# Patient Record
Sex: FEMALE | Race: WHITE
[De-identification: names, ages, dates, MRNs, and addresses within clinical notes are randomized per-mention and may not be internally consistent; named-entity substitution may affect disease eponyms.]

---

## 2017-06-05 ENCOUNTER — HOSPITAL ENCOUNTER (EMERGENCY)
Dept: HOSPITAL 31 - C.ER | Age: 77
Discharge: HOME | End: 2017-06-05
Payer: COMMERCIAL

## 2017-06-05 VITALS
SYSTOLIC BLOOD PRESSURE: 159 MMHG | DIASTOLIC BLOOD PRESSURE: 68 MMHG | RESPIRATION RATE: 18 BRPM | TEMPERATURE: 98.3 F | HEART RATE: 73 BPM

## 2017-06-05 VITALS — OXYGEN SATURATION: 100 %

## 2017-06-05 VITALS — BODY MASS INDEX: 24.5 KG/M2
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
## 2017-06-05 DIAGNOSIS — S46.912A: Primary | ICD-10-CM

## 2017-06-05 LAB
ALBUMIN/GLOB SERPL: 1.1 {RATIO}
ALP SERPL-CCNC: 62 U/L
ALT SERPL-CCNC: 16 U/L
AST SERPL-CCNC: 30 U/L
BACTERIA #/AREA URNS HPF: (no result) /[HPF]
BASOPHILS # BLD AUTO: 0 K/UL
BASOPHILS NFR BLD: 0.6 %
BILIRUB SERPL-MCNC: 0.7 MG/DL
BILIRUB UR-MCNC: NEGATIVE MG/DL
BUN SERPL-MCNC: 19 MG/DL
CALCIUM SERPL-MCNC: 8.7 MG/DL
CHLORIDE SERPL-SCNC: 99 MMOL/L
CO2 SERPL-SCNC: 26 MMOL/L
EOSINOPHIL # BLD AUTO: 0.3 K/UL
EOSINOPHIL NFR BLD: 3.6 %
ERYTHROCYTE [DISTWIDTH] IN BLOOD BY AUTOMATED COUNT: 13.6 %
GLOBULIN SER-MCNC: 3.7 GM/DL
GLUCOSE SERPL-MCNC: 131 MG/DL
GLUCOSE UR STRIP-MCNC: NORMAL MG/DL
HCT VFR BLD CALC: 38.6 %
KETONES UR STRIP-MCNC: NEGATIVE MG/DL
LEUKOCYTE ESTERASE UR-ACNC: (no result) LEU/UL
LYMPHOCYTES # BLD AUTO: 4 K/UL
LYMPHOCYTES NFR BLD AUTO: 47.9 %
MCH RBC QN AUTO: 27.9 PG
MCHC RBC AUTO-ENTMCNC: 32.2 G/DL
MCV RBC AUTO: 86.6 FL
MONOCYTES # BLD: 0.8 K/UL
MONOCYTES NFR BLD: 9.8 %
NRBC BLD AUTO-RTO: 0 %
PH UR STRIP: 6 [PH]
PLATELET # BLD: 354 K/UL
PMV BLD AUTO: 7.5 FL
POTASSIUM SERPL-SCNC: 4.6 MMOL/L
PROT SERPL-MCNC: 7.7 G/DL
PROT UR STRIP-MCNC: NEGATIVE MG/DL
RBC # UR STRIP: (no result) /UL
RBC #/AREA URNS HPF: 10 /HPF
SODIUM SERPL-SCNC: 137 MMOL/L
SP GR UR STRIP: 1.01
UROBILINOGEN UR-MCNC: NORMAL MG/DL
WBC # BLD AUTO: 8.3 K/UL
WBC #/AREA URNS HPF: 24 /HPF

## 2017-06-05 NOTE — C.PDOC
History Of Present Illness


75 y/o female presents to the emergency department for evaluation of left-sided 

trapezius discomfort which radiates down left arm for 2 days. Patient denies 

recent trauma/falls and has not sought therapy for symptoms.


Time Seen by Provider: 17 14:16


Chief Complaint (Nursing): Upper Extremity Problem/Injury


History Per: Patient


History/Exam Limitations: no limitations


Onset/Duration Of Symptoms: Days (2)


Current Symptoms Are (Timing): Still Present


Quality: "Pain"


Additional History Per: Patient





Past Medical History


Reviewed: Historical Data, Nursing Documentation, Vital Signs


Vital Signs: 


 Last Vital Signs











Temp  98.3 F   17 15:51


 


Pulse  73   17 15:51


 


Resp  18   17 15:51


 


BP  159/68 H  17 15:51


 


Pulse Ox  100   17 16:08














- Medical History


PMH: HTN


Surgical History: No Surg Hx


Family History: States: Unknown Family Hx





- Social History


Hx Tobacco Use: No


Hx Alcohol Use: No


Hx Substance Use: No





- Immunization History


Hx Tetanus Toxoid Vaccination: No


Hx Influenza Vaccination: Yes


Hx Pneumococcal Vaccination: No





Review Of Systems


Except As Marked, All Systems Reviewed And Found Negative.


Musculoskeletal: Positive for: Arm Pain (left), Other (+left-sided trapezius 

discomfort )


Skin: Negative for: Other (direct trauma/injury or falls)





Physical Exam





- Physical Exam


Appears: Non-toxic, No Acute Distress


Skin: Normal Color, Warm, Dry, No Rash


Head: Atraumatic, Normacephalic


Eye(s): bilateral: Normal Inspection, PERRL, EOMI


Oral Mucosa: Moist


Neck: Normal ROM, Supple


Chest: Symmetrical, No Deformity, No Tenderness


Cardiovascular: Rhythm Regular, No Murmur


Respiratory: Normal Breath Sounds, No Rales, No Rhonchi, No Wheezing


Gastrointestinal/Abdominal: Soft, No Tenderness, No Guarding, No Rebound


Back: Other (+left-sided trapezius tenderness )


Extremity: Normal ROM, Capillary Refill (less than 2 seconds )


Pulses: Left Radial: Normal, Right Radial: Normal


Neurological/Psych: Oriented x3, Normal Speech, Normal Cognition, Normal 

Sensation


Gait: Steady





ED Course And Treatment





- Laboratory Results


Result Diagrams: 


 17 14:53





 17 14:53


Lab Interpretation: Normal (trop/bnp neg., UA 24 WBC's)


ECG: Interpreted By Me


ECG Rhythm: Sinus Rhythm


ECG Interpretation: Normal


Rate From EC


O2 Sat by Pulse Oximetry: 100 (on RA)


Pulse Ox Interpretation: Normal





- Radiology


CXR: Interpreted by Me


CXR Interpretation: Yes: No Acute Disease


Progress Note: ice pack to L trapezius,  and motrin PO


Reevaluation Time: 16:04


Reassessment Condition: Improved





Medical Decision Making


Medical Decision Making: 





L trapezius strain/sprain, digitally and positionally reproducable.


neg cardiac w/u.





defer tx of asymptomatic mild pyuria for risk of abx in this age group





Disposition


Doctor Will See Patient In The: Office


Counseled Patient/Family Regarding: Studies Performed, Diagnosis





- Disposition


Referrals: 


[facility] [Outside]


Disposition: HOME/ ROUTINE


Disposition Time: 16:05


Condition: GOOD


Additional Instructions: 


sigue hielo 1/2 hora por hora, nada caliente


Ibuprofeno 400-600 mg cada 6 horas елена necessario.


Pepcid 20 mg en la noche para prevenir irritacion' del estomago debido al 

ibuprofeno.





Sigue con conley medico de cabezera o' con nuestro clinica (gratis) елена necessario





Todo conley evaluacion' cardiologico salio NORMAL


Instructions:  Muscle Strain (ED)


Print Language: Peruvian





- Clinical Impression


Clinical Impression: 


 Muscle strain








- Scribe Statement


The provider has reviewed the documentation as recorded by the Scribe (Molly Koch)


Provider Attestation: 








All medical record entries made by the Scribe were at my direction and 

personally dictated by me. I have reviewed the chart and agree that the record 

accurately reflects my personal performance of the history, physical exam, 

medical decision making, and the department course for this patient. I have 

also personally directed, reviewed, and agree with the discharge instructions 

and disposition.

## 2017-06-05 NOTE — RAD
HISTORY:

SOB  



COMPARISON:

No prior.



TECHNIQUE:

Chest PA and lateral



FINDINGS:



LUNGS:

No active pulmonary disease.



PLEURA:

No significant pleural effusion identified. No pneumothorax apparent.



CARDIOVASCULAR:

Normal.



OSSEOUS STRUCTURES:

No significant abnormalities.



VISUALIZED UPPER ABDOMEN:

Normal.



OTHER FINDINGS:

None.



IMPRESSION:

No active disease.

## 2017-06-06 NOTE — CARD
--------------- APPROVED REPORT --------------





EKG Measurement

Heart [id number]

LA [id number]

[id number]

[id number]

[id number]



<Conclusion>

Normal sinus rhythm

Voltage criteria for left ventricular hypertrophy

Nonspecific T wave abnormality

Prolonged QT

Abnormal ECG

## 2018-05-08 ENCOUNTER — HOSPITAL ENCOUNTER (EMERGENCY)
Dept: HOSPITAL 31 - C.ER | Age: 78
Discharge: HOME | End: 2018-05-08
Payer: COMMERCIAL

## 2018-05-08 VITALS — SYSTOLIC BLOOD PRESSURE: 130 MMHG | HEART RATE: 80 BPM | DIASTOLIC BLOOD PRESSURE: 54 MMHG

## 2018-05-08 VITALS — BODY MASS INDEX: 24.5 KG/M2

## 2018-05-08 VITALS — TEMPERATURE: 99.2 F | RESPIRATION RATE: 20 BRPM

## 2018-05-08 VITALS — OXYGEN SATURATION: 100 %

## 2018-05-08 DIAGNOSIS — M54.5: ICD-10-CM

## 2018-05-08 DIAGNOSIS — G89.29: ICD-10-CM

## 2018-05-08 DIAGNOSIS — H26.9: ICD-10-CM

## 2018-05-08 DIAGNOSIS — R51: Primary | ICD-10-CM

## 2018-05-08 LAB
ALBUMIN SERPL-MCNC: 4.2 G/DL (ref 3.5–5)
ALBUMIN/GLOB SERPL: 1.1 {RATIO} (ref 1–2.1)
ALT SERPL-CCNC: 12 U/L (ref 9–52)
APTT BLD: 33 SECONDS (ref 21–34)
AST SERPL-CCNC: 31 U/L (ref 14–36)
BASOPHILS # BLD AUTO: 0.1 K/UL (ref 0–0.2)
BASOPHILS NFR BLD: 0.6 % (ref 0–2)
BUN SERPL-MCNC: 12 MG/DL (ref 7–17)
CALCIUM SERPL-MCNC: 9.4 MG/DL (ref 8.6–10.4)
CK MB SERPL-MCNC: 1.02 NG/ML (ref 0–3.38)
EOSINOPHIL # BLD AUTO: 0.2 K/UL (ref 0–0.7)
EOSINOPHIL NFR BLD: 2.5 % (ref 0–4)
ERYTHROCYTE [DISTWIDTH] IN BLOOD BY AUTOMATED COUNT: 13.7 % (ref 11.5–14.5)
GFR NON-AFRICAN AMERICAN: 54
HGB BLD-MCNC: 14.1 G/DL (ref 11–16)
INR PPP: 1
LYMPHOCYTES # BLD AUTO: 2.9 K/UL (ref 1–4.3)
LYMPHOCYTES NFR BLD AUTO: 30 % (ref 20–40)
MCH RBC QN AUTO: 29.3 PG (ref 27–31)
MCHC RBC AUTO-ENTMCNC: 33.3 G/DL (ref 33–37)
MCV RBC AUTO: 87.9 FL (ref 81–99)
MONOCYTES # BLD: 0.7 K/UL (ref 0–0.8)
MONOCYTES NFR BLD: 7.6 % (ref 0–10)
NEUTROPHILS # BLD: 5.6 K/UL (ref 1.8–7)
NEUTROPHILS NFR BLD AUTO: 59.3 % (ref 50–75)
NRBC BLD AUTO-RTO: 0 % (ref 0–2)
PLATELET # BLD: 361 K/UL (ref 130–400)
PMV BLD AUTO: 7.7 FL (ref 7.2–11.7)
PROTHROMBIN TIME: 10.8 SECONDS (ref 9.7–12.2)
RBC # BLD AUTO: 4.81 MIL/UL (ref 3.8–5.2)
WBC # BLD AUTO: 9.5 K/UL (ref 4.8–10.8)

## 2018-05-08 PROCEDURE — 93005 ELECTROCARDIOGRAM TRACING: CPT

## 2018-05-08 PROCEDURE — 82948 REAGENT STRIP/BLOOD GLUCOSE: CPT

## 2018-05-08 PROCEDURE — 70450 CT HEAD/BRAIN W/O DYE: CPT

## 2018-05-08 PROCEDURE — 82553 CREATINE MB FRACTION: CPT

## 2018-05-08 PROCEDURE — 99285 EMERGENCY DEPT VISIT HI MDM: CPT

## 2018-05-08 PROCEDURE — 84484 ASSAY OF TROPONIN QUANT: CPT

## 2018-05-08 PROCEDURE — 85025 COMPLETE CBC W/AUTO DIFF WBC: CPT

## 2018-05-08 PROCEDURE — 80053 COMPREHEN METABOLIC PANEL: CPT

## 2018-05-08 PROCEDURE — 85610 PROTHROMBIN TIME: CPT

## 2018-05-08 PROCEDURE — 85730 THROMBOPLASTIN TIME PARTIAL: CPT

## 2018-05-08 PROCEDURE — 96374 THER/PROPH/DIAG INJ IV PUSH: CPT

## 2018-05-08 PROCEDURE — 82550 ASSAY OF CK (CPK): CPT

## 2018-05-08 PROCEDURE — 71045 X-RAY EXAM CHEST 1 VIEW: CPT

## 2018-05-08 NOTE — C.PDOC
History Of Present Illness


77-year-old female, whose PMHx includes Migraines and Hypertension, is brought 

to the emergency department by daughter, with complaints of a frontal headache 

that is associated with nausea and light sensitivity, since yesterday. Patient 

states she took multiple Ibuprofen's with no relief. She has a Hx of headaches, 

but this one is stronger in intensity. Patient is also complaining of lower 

back pain that she has had chronically. She denies vomiting, fever, visual 

changes, facial droop, numbness/weakness, or any other associated symptoms. No 

other complaints at this time.


Time Seen by Provider: 18 07:00


Chief Complaint (Nursing): Headache


History Per: Patient


History/Exam Limitations: no limitations


Current Symptoms Are (Timing): Still Present


Severity: Moderate





Past Medical History


Reviewed: Historical Data, Nursing Documentation, Vital Signs


Vital Signs: 


 Last Vital Signs











Temp  99.2 F   18 06:33


 


Pulse  82   18 07:40


 


Resp  20   18 07:40


 


BP  156/58 H  18 07:40


 


Pulse Ox  100   18 08:50














- Medical History


PMH: HTN, Migraine


Family History: States: No Known Family Hx





- Social History


Hx Tobacco Use: No


Hx Alcohol Use: No


Hx Substance Use: No





- Immunization History


Hx Tetanus Toxoid Vaccination: No


Hx Influenza Vaccination: Yes


Hx Pneumococcal Vaccination: No





Review Of Systems


Constitutional: Negative for: Fever, Chills


Eyes: Positive for: Other (light sensitivity.)


Gastrointestinal: Negative for: Vomiting


Musculoskeletal: Negative for: Neck Pain


Neurological: Positive for: Headache.  Negative for: Weakness, Numbness, 

Dizziness





Physical Exam





- Physical Exam


Appears: Non-toxic, Other (mild-moderate pain)


Skin: Normal Color, Warm, Dry, No Rash


Head: Normacephalic


Eye(s): bilateral: Normal Inspection, PERRL, EOMI


Nose: Normal


Oral Mucosa: Moist


Lips: Normal Appearing


Neck: Normal ROM, Supple


Cardiovascular: Rhythm Regular, No Murmur


Respiratory: Normal Breath Sounds, No Accessory Muscle Use


Gastrointestinal/Abdominal: Soft, No Tenderness


Extremity: Normal ROM, No Deformity, No Swelling


Neurological/Psych: Oriented x3, Normal Speech, Normal Motor, Normal Sensation, 

Other (No focal deficits)





ED Course And Treatment





- Laboratory Results


Result Diagrams: 


 18 07:40





 18 07:40


O2 Sat by Pulse Oximetry: 100





- Other Rad


  ** CXR


X-Ray: Viewed By Me, Read By Radiologist


Interpretation: Accession No. : [id number].  Patient Name / ID : YO GOMEZ  / [id number].  Exam Date : 2018 07:49:17 ( Approved ).  

Study Comment :  Sex / Age : F  / 77Y.  Creator : Philippe Martínez MD.  

Dictator : Philippe Martínez MD.   :  Approver : Philippe Martínez MD.  Approver2 :  Report Date : 2018 08:06:41.  My Comment :  ************

***********************************************************************.  Chest 

x-ray single frontal view.  History: Evaluation.  Comparison: 2017.  

Findings:  Biapical pleural thickening with upper lobe granulomatous changes.  

Mild venous congestion.  Patchy increased markings at the left lung base.  

Tortuous ectatic aorta.  Calcification at the aortic knob.  Degenerative 

changes in the spine and shoulders.  Impression:  Biapical pleural thickening 

with upper lobe granulomatous changes.  Mild venous congestion.  Patchy 

increased markings at the left lung base.  Tortuous ectatic aorta.  

Calcification at the aortic knob.





- CT Scan/US


  ** CT head


Other Rad Studies (CT/US): Read By Radiologist, Radiology Report Reviewed


CT/US Interpretation: Accession No. : [id number].  Patient Name / ID : 

YO GOMEZ  / [id number].  Exam Date : 2018 06:56:48 ( 

Approved ).  Study Comment :  Sex / Age : F  / 077Y.  Creator : RICHY TALBERT.  Dictator :   :  Approver : RICHY TALBERT.  Approver2 :  

Report Date : 2018 07:09:00.  My Comment :  ******************************

*****************************************************.  [facility] Division of Radiology.  [address] [place] [address].  Tel. no. [phone].  .  .  Patient Name: JEANETH AMARAL

            Account #: [id number].  Pt. Address: [address] [place] [address]. Rec #: [id number].  [place] [address]            Ordering Dr

: Evelina Baptiste DO  Pt Phone: [phone]                             Order 

Location: [facility]  : 1940 Female Age: 77            Order #: [id number].  

Accession #: [id number].  Reason for exam: HEADACHE.  .  .  .  .  .  CT 

Scan.  .  .  HEAD W/O CONTRAST                              Exam Date: .  .  This imaging exam was performed at [facility].  EXAM:  CT Head 

Without Intravenous  Contrast.  .  CLINICAL HISTORY:  77 years old, female; Pain

; Headache; Patient HX: 11-3-16 images sent.  .  TECHNIQUE:  Axial computed 

tomography images of the head/brain without intravenous.  contrast.  All CT 

scans at this facility use one or more dose reduction.  techniques, viz.: 

automated exposure control; ma/kV adjustment per patient size.  (including 

targeted exams where dose is matched to indication; i.e. head); or.  iterative 

reconstruction technique.  290 images are submitted.  Coronal and sagittal 

reformatted images were created and reviewed.  .  COMPARISON:  CT - HEAD W/O 

CONTRAST 2016 19:43.  .  FINDINGS:  Brain:  Right basal ganglia 

calcifications.  No hemorrhage.  Ventricles:  Unremarkable.  No 

ventriculomegaly.  Bones/joints:  Unremarkable.  No acute fracture.  Soft 

tissues:  Unremarkable.  Sinuses:  Unremarkable.  No acute sinusitis.  Mastoid 

air cells:  Unremarkable.  No mastoid effusion.  Orbits:  The globes are 

intact.  Correlation with patient's clinical history.  of ophthalmic surgery is 

recommended.  Dental:  Edentulous maxilla and mandible.  .  IMPRESSION:  No 

evidence of an acute intracranial hemorrhage, midline shift or mass effect.  is 

identified.  .  Dictated By:  RICHY TALBERT.  Dictated Date/Time:  18.  Signed By:  RICHY TALBERT MD.  Date Signed: 18.  

Transcribed By: MARIA.  Transcribe Date/Time: 18.  CALEB/MT


Progress Note: CT Head, EKG, bloodwork and chest xray ordered and reviewed. 

Patient treated with 30mg IV Toradol and 650mg PO Tylenol.





Disposition


Counseled Patient/Family Regarding: Studies Performed, Diagnosis, Need For 

Followup, Rx Given





- Disposition


Referrals: 


[facility] [Outside]


Sim Coburn MD [Staff Provider] - 


Disposition: HOME/ ROUTINE


Disposition Time: 10:10


Condition: STABLE


Additional Instructions: 


FOLLOW UP WITH EYE DOCTOR WITHIN 1 WEEK





USE MEDICATION AS NEEDED FOR HEADACHE





RETURN TO EMERGENCY ROOM IF SYMPTOMS WORSEN 








SEGUIR CON EL DOCTOR DE ZIA DENTRO DE 1 SEMANA





USE MEDICAMENTO SEGN SEA NECESARIO PARA DOLOR DE JESS





REGRESE AL DAGMAR DE EMERGENCIA SI LOS SNTOMAS EMPEORAN


Prescriptions: 


Acetaminophen/Butalbital/Caf [Fioricet] 1 tab PO TID PRN #20 tab


 PRN Reason: Headache


Instructions:  Headache, Adult (DC)


Forms:  [facility] (English)


Print Language: Tunisian





- POA


Present On Arrival: None





- Clinical Impression


Clinical Impression: 


 Headache, Chronic low back pain, Cataract








- Scribe Statement


The provider has reviewed the documentation as recorded by the Scribe (Nishant Benito)


All medical record entries made by the Scribe were at my direction and 

personally dictated by me. I have reviewed the chart and agree that the record 

accurately reflects my personal performance of the history, physical exam, 

medical decision making, and the department course for this patient. I have 

also personally directed, reviewed, and agree with the discharge instructions 

and disposition.

## 2018-05-08 NOTE — CT
EXAM:

  CT Head Without Intravenous  Contrast



CLINICAL HISTORY:

  77 years old, female; Pain; Headache; Patient HX: 11-3-16 images sent



TECHNIQUE:

  Axial computed tomography images of the head/brain without intravenous 

contrast.  All CT scans at this facility use one or more dose reduction 

techniques, viz.: automated exposure control; ma/kV adjustment per patient size 

(including targeted exams where dose is matched to indication; i.e. head); or 

iterative reconstruction technique.  290 images are submitted.

  Coronal and sagittal reformatted images were created and reviewed.



COMPARISON:

  CT - HEAD W/O CONTRAST 2016-11-03 19:43



FINDINGS:

  Brain:  Right basal ganglia calcifications.  No hemorrhage.

  Ventricles:  Unremarkable.  No ventriculomegaly.

  Bones/joints:  Unremarkable.  No acute fracture.

  Soft tissues:  Unremarkable.

  Sinuses:  Unremarkable.  No acute sinusitis.

  Mastoid air cells:  Unremarkable.  No mastoid effusion.

  Orbits:  The globes are intact.  Correlation with patient's clinical history 

of ophthalmic surgery is recommended.

  Dental:  Edentulous maxilla and mandible.



IMPRESSION:     

  No evidence of an acute intracranial hemorrhage, midline shift or mass effect 

is identified.

## 2018-05-08 NOTE — RAD
Chest x-ray single frontal view 



History: Evaluation. 



Comparison: 06/05/2017 



Findings:



Biapical pleural thickening with upper lobe granulomatous changes. 



Mild venous congestion. 



Patchy increased markings at the left lung base. 



Tortuous ectatic aorta. 



Calcification at the aortic knob. 



Degenerative changes in the spine and shoulders. 



Impression:



Biapical pleural thickening with upper lobe granulomatous changes. 



Mild venous congestion. 



Patchy increased markings at the left lung base. 



Tortuous ectatic aorta. 



Calcification at the aortic knob.

## 2018-09-15 ENCOUNTER — HOSPITAL ENCOUNTER (EMERGENCY)
Dept: HOSPITAL 31 - C.ER | Age: 78
Discharge: HOME | End: 2018-09-15
Payer: COMMERCIAL

## 2018-09-15 VITALS — SYSTOLIC BLOOD PRESSURE: 136 MMHG | DIASTOLIC BLOOD PRESSURE: 54 MMHG | RESPIRATION RATE: 14 BRPM | TEMPERATURE: 97.9 F

## 2018-09-15 VITALS — OXYGEN SATURATION: 97 %

## 2018-09-15 VITALS — BODY MASS INDEX: 24.5 KG/M2

## 2018-09-15 VITALS — HEART RATE: 69 BPM

## 2018-09-15 DIAGNOSIS — I10: ICD-10-CM

## 2018-09-15 DIAGNOSIS — N39.0: Primary | ICD-10-CM

## 2018-09-15 LAB
ALBUMIN SERPL-MCNC: 4.2 G/DL (ref 3.5–5)
ALBUMIN/GLOB SERPL: 1.3 {RATIO} (ref 1–2.1)
ALT SERPL-CCNC: 22 U/L (ref 9–52)
APTT BLD: 34 SECONDS (ref 21–34)
AST SERPL-CCNC: 16 U/L (ref 14–36)
BACTERIA #/AREA URNS HPF: (no result) /[HPF]
BASOPHILS # BLD AUTO: 0 K/UL (ref 0–0.2)
BASOPHILS NFR BLD: 0.5 % (ref 0–2)
BILIRUB UR-MCNC: NEGATIVE MG/DL
BUN SERPL-MCNC: 16 MG/DL (ref 7–17)
CALCIUM SERPL-MCNC: 9.6 MG/DL (ref 8.6–10.4)
EOSINOPHIL # BLD AUTO: 0.2 K/UL (ref 0–0.7)
EOSINOPHIL NFR BLD: 2.3 % (ref 0–4)
ERYTHROCYTE [DISTWIDTH] IN BLOOD BY AUTOMATED COUNT: 13.6 % (ref 11.5–14.5)
GFR NON-AFRICAN AMERICAN: > 60
GLUCOSE UR STRIP-MCNC: NORMAL MG/DL
HGB BLD-MCNC: 12.7 G/DL (ref 11–16)
INR PPP: 1
LEUKOCYTE ESTERASE UR-ACNC: (no result) LEU/UL
LYMPHOCYTES # BLD AUTO: 3.4 K/UL (ref 1–4.3)
LYMPHOCYTES NFR BLD AUTO: 41.4 % (ref 20–40)
MCH RBC QN AUTO: 28.6 PG (ref 27–31)
MCHC RBC AUTO-ENTMCNC: 33.2 G/DL (ref 33–37)
MCV RBC AUTO: 86 FL (ref 81–99)
MONOCYTES # BLD: 0.8 K/UL (ref 0–0.8)
MONOCYTES NFR BLD: 9.1 % (ref 0–10)
NEUTROPHILS # BLD: 3.9 K/UL (ref 1.8–7)
NEUTROPHILS NFR BLD AUTO: 46.7 % (ref 50–75)
NRBC BLD AUTO-RTO: 0.1 % (ref 0–2)
PH UR STRIP: 5 [PH] (ref 5–8)
PLATELET # BLD: 374 K/UL (ref 130–400)
PMV BLD AUTO: 7.6 FL (ref 7.2–11.7)
PROT UR STRIP-MCNC: NEGATIVE MG/DL
PROTHROMBIN TIME: 11.3 SECONDS (ref 9.7–12.2)
RBC # BLD AUTO: 4.44 MIL/UL (ref 3.8–5.2)
RBC # UR STRIP: (no result) /UL
SP GR UR STRIP: 1.01 (ref 1–1.03)
SQUAMOUS EPITHIAL: 2 /HPF (ref 0–5)
UROBILINOGEN UR-MCNC: NORMAL MG/DL (ref 0.2–1)
WBC # BLD AUTO: 8.3 K/UL (ref 4.8–10.8)

## 2018-09-15 PROCEDURE — 85025 COMPLETE CBC W/AUTO DIFF WBC: CPT

## 2018-09-15 PROCEDURE — 70450 CT HEAD/BRAIN W/O DYE: CPT

## 2018-09-15 PROCEDURE — 87040 BLOOD CULTURE FOR BACTERIA: CPT

## 2018-09-15 PROCEDURE — 81001 URINALYSIS AUTO W/SCOPE: CPT

## 2018-09-15 PROCEDURE — 87086 URINE CULTURE/COLONY COUNT: CPT

## 2018-09-15 PROCEDURE — 71045 X-RAY EXAM CHEST 1 VIEW: CPT

## 2018-09-15 PROCEDURE — 80053 COMPREHEN METABOLIC PANEL: CPT

## 2018-09-15 PROCEDURE — 93005 ELECTROCARDIOGRAM TRACING: CPT

## 2018-09-15 PROCEDURE — 84484 ASSAY OF TROPONIN QUANT: CPT

## 2018-09-15 PROCEDURE — 96365 THER/PROPH/DIAG IV INF INIT: CPT

## 2018-09-15 PROCEDURE — 96361 HYDRATE IV INFUSION ADD-ON: CPT

## 2018-09-15 PROCEDURE — 74019 RADEX ABDOMEN 2 VIEWS: CPT

## 2018-09-15 PROCEDURE — 85610 PROTHROMBIN TIME: CPT

## 2018-09-15 PROCEDURE — 99285 EMERGENCY DEPT VISIT HI MDM: CPT

## 2018-09-15 PROCEDURE — 85730 THROMBOPLASTIN TIME PARTIAL: CPT

## 2018-09-15 PROCEDURE — 87181 SC STD AGAR DILUTION PER AGT: CPT

## 2018-09-15 NOTE — CT
Date of service: 



09/15/2018



PROCEDURE:  CT HEAD WITHOUT CONTRAST.



HISTORY:

AMS



COMPARISON:

CT head dated 05/08/2018



TECHNIQUE:

Axial computed tomography images were obtained through the head/brain 

without intravenous contrast.  



Radiation dose:



Total exam DLP = 885.4 mGy-cm.



This CT exam was performed using one or more of the following dose 

reduction techniques: Automated exposure control, adjustment of the 

mA and/or kV according to patient size, and/or use of iterative 

reconstruction technique.



FINDINGS:



HEMORRHAGE:

No intracranial hemorrhage. 



BRAIN:

No mass effect or edema. Atrophy. Chronic microvascular ischemic 

changes.



VENTRICLES:

Unremarkable. No hydrocephalus. 



CALVARIUM:

Unremarkable.



PARANASAL SINUSES:

Unremarkable as visualized. No significant inflammatory changes.



MASTOID AIR CELLS:

Unremarkable as visualized. No inflammatory changes.



OTHER FINDINGS:

None.



IMPRESSION:

No acute intracranial pathology.  Age-related changes.  No 

significant interval change.

## 2018-09-15 NOTE — RAD
Date of service: 



09/15/2018



HISTORY:

PAIN  



COMPARISON:

No prior.



FINDINGS:



BOWEL:

Normal. No obstruction. No free air. 



BONES:

S-shaped scoliosis of the thoracolumbar spine.



OTHER FINDINGS:

None.



IMPRESSION:

No active disease.

## 2018-09-15 NOTE — RAD
Date of service: 



09/15/2018



PROCEDURE:  CHEST RADIOGRAPH, 1 VIEW



HISTORY:

AMS



COMPARISON:

Chest radiograph dated 05/08/2018



FINDINGS:



LUNGS:

Clear.



PLEURA:

No pneumothorax or pleural fluid seen.



CARDIOVASCULAR:

Atherosclerotic aortic calcifications.  Cardiomediastinal silhouette 

stably enlarged.



OSSEOUS STRUCTURES:

Unchanged.



VISUALIZED UPPER ABDOMEN:

Normal.



OTHER FINDINGS:

None. 



IMPRESSION:

No active disease.

## 2018-09-15 NOTE — C.PDOC
History Of Present Illness





77-year-old female, with PMHx that includes Migraines and Hypertension, brought 

to the emergency department by daughter for evaluation of dizziness for past 3 

days associated with diffuse lower abdominal discomfort, (+) foul  smelling 

urine. Otherwise, pt and daughter denies high fever, chills, LOC, syncope, CP, 

SOB, dyspnea, diaphoresis, palpitation, V/D, back pain, hematuria. Ambulate to 

ED for evaluation, not in any apparent distress.


Time Seen by Provider: 09/15/18 11:07


Chief Complaint (Nursing): Dizziness/Lightheaded


History Per: Patient, Family


Onset/Duration Of Symptoms: Gradual





Past Medical History


Reviewed: Historical Data, Nursing Documentation, Vital Signs


Vital Signs: 


 Last Vital Signs











Temp  97.9 F   09/15/18 13:28


 


Pulse  69   09/15/18 13:28


 


Resp  14   09/15/18 13:28


 


BP  136/54 L  09/15/18 13:28


 


Pulse Ox  98   09/15/18 13:28














- Medical History


PMH: HTN, Migraine


Family History: States: No Known Family Hx





- Social History


Hx Tobacco Use: No


Hx Alcohol Use: No


Hx Substance Use: No





- Immunization History


Hx Tetanus Toxoid Vaccination: No


Hx Influenza Vaccination: No


Hx Pneumococcal Vaccination: No





Review Of Systems


Except As Marked, All Systems Reviewed And Found Negative.


Constitutional: Negative for: Fever, Chills


Eyes: Negative for: Vision Change


ENT: Negative for: Throat Pain


Cardiovascular: Negative for: Chest Pain, Palpitations, Edema


Respiratory: Negative for: Cough, Shortness of Breath, Wheezing


Gastrointestinal: Positive for: Abdominal Pain.  Negative for: Nausea, Vomiting


Genitourinary: Positive for: Dysuria.  Negative for: Vaginal Discharge, Vaginal 

Bleeding


Musculoskeletal: Negative for: Neck Pain


Skin: Negative for: Rash


Neurological: Positive for: Dizziness.  Negative for: Weakness, Numbness, 

Altered Mental Status, Headache





Physical Exam





- Physical Exam


Appears: Well, Non-toxic, No Acute Distress


Skin: Normal Color, Warm, Dry, No Rash


Head: Normacephalic


Eye(s): bilateral: PERRL


Nose: No Flaring, No Discharge


Oral Mucosa: Moist


Tongue: Normal Appearing


Throat: No Erythema


Neck: Trachea Midline, Supple


Cardiovascular: Rhythm Regular, No Murmur, No JVD


Respiratory: No Decreased Breath Sounds, No Accessory Muscle Use, No Stridor, 

No Wheezing


Gastrointestinal/Abdominal: Soft, Tenderness (mild suprapubic), No Distention, 

No Guarding, No Rebound


Back: No CVA Tenderness


Extremity: Normal ROM, No Deformity, No Swelling


Neurological/Psych: Oriented x3, Normal Speech, Normal Motor, Normal Sensation, 

Normal Reflexes





ED Course And Treatment





- Laboratory Results


Result Diagrams: 


 09/15/18 11:30





 09/15/18 11:30


Lab Interpretation: Abnormal


ECG: Interpreted By Me, Viewed By Me


Interpretation Of ECG: SR@65/min, LAD, LVH, no acute ST-T changes


O2 Sat by Pulse Oximetry: 97


Pulse Ox Interpretation: Normal





- Radiology


CXR: Read By Radiologist


CXR Interpretation: Yes: No Acute Disease





- Other Rad


  ** Obstructive serial


X-Ray: Read By Radiologist


Interpretation: no acute findings





- CT Scan/US


  ** CT head w/o contrast


Other Rad Studies (CT/US): Read By Radiologist


CT/US Interpretation: IMPRESSION:  No acute intracranial pathology.  Age-

related changes.  No significant interval change.


Progress Note: On re-eval, pt remained stable.  Afebrile, hemodynamicaly 

stable.  Non-toxic.  Pt is ambulatory in ED with stable gait.  Pt eat breakfast

, tolerate Po well.  Blood work review and appears without acute abnormalities, 

no acute leukocytosis or left shift, chemistry- normal, no dehydration.  UA (+) 

nitrate.  Ucx- pending.  Pt received Rocephin IV now.  Results review and 

discussed with pt and daughter, agrees with plan.





Disposition


Counseled Patient/Family Regarding: Studies Performed, Diagnosis, Need For 

Followup, Rx Given





- Disposition


Referrals: 


[facility] [Outside]


Disposition: HOME/ ROUTINE


Disposition Time: 12:32


Condition: STABLE


Additional Instructions: 


Encourage fluids


Take medication as prescribed


Follow up with PMD in 1-2 days for re-evaluation.


return to ED if any worsening or new changes.


Prescriptions: 


Cefpodoxime [Vantin] 400 mg PO BID #28 tab


Instructions:  Urinary Tract Infections in Adults


Forms:  [facility] (English)


Print Language: Maldivian





- Clinical Impression


Clinical Impression: 


 Urinary tract infection

## 2018-09-17 NOTE — CARD
--------------- APPROVED REPORT --------------





Date of service: 09/15/2018



EKG Measurement

Heart [id number]

KS [id number]

[id number]

[id number]

[id number]



<Conclusion>

Normal sinus rhythm

Left ventricular hypertrophy with repolarization abnormality

Abnormal ECG